# Patient Record
Sex: MALE | Race: WHITE | URBAN - METROPOLITAN AREA
[De-identification: names, ages, dates, MRNs, and addresses within clinical notes are randomized per-mention and may not be internally consistent; named-entity substitution may affect disease eponyms.]

---

## 2017-02-02 ENCOUNTER — OFFICE VISIT (OUTPATIENT)
Dept: FAMILY MEDICINE | Facility: CLINIC | Age: 47
End: 2017-02-02

## 2017-02-02 VITALS
OXYGEN SATURATION: 96 % | HEART RATE: 77 BPM | WEIGHT: 260 LBS | BODY MASS INDEX: 37.22 KG/M2 | SYSTOLIC BLOOD PRESSURE: 140 MMHG | DIASTOLIC BLOOD PRESSURE: 88 MMHG | TEMPERATURE: 98.2 F | RESPIRATION RATE: 15 BRPM | HEIGHT: 70 IN

## 2017-02-02 DIAGNOSIS — R07.0 THROAT PAIN: Primary | ICD-10-CM

## 2017-02-02 NOTE — MR AVS SNAPSHOT
After Visit Summary   2/2/2017    Silvestre Childers    MRN: [id number]           Patient Information     Date Of Birth          1970        Visit Information        Provider Department      2/2/2017 8:00 PM Clinician, Lucia [facility]        Care Instructions    Patient Visit Summary    Patient Name: Silvestre Childers  MRN: [id number]    Date of Visit: 2/2/2017    Physician's Recommendations/Instructions:   1. Combine liquid Benadryl and Maalox in equal parts, swish mixture in your throat and then spit the medicine out. Take every 4 hours as needed to sooth throat pain.   2. Take Cepacol numbing cough drops to sooth throat pain   3. To reduce nasal drip and congestion - use a saline spray (i.e. Oceanspray) to flush nose daily or twice daily.  3. Continue drinking fluids        Referrals and Instructions:   1. Return to [facility] to talk with [employer] about employment   2. Visit or call [facility] at [phone] to ask about their dental care options.     Physician: Dr. Sanchez         Follow-ups after your visit        Who to contact     Please call your clinic at [phone] to:    Ask questions about your health    Make or cancel appointments    Discuss your medicines    Learn about your test results    Speak to your doctor   If you have compliments or concerns about an experience at your clinic, or if you wish to file a complaint, please contact [facility] Patient Relations at [phone] or email us at [email or web address]         Additional Information About Your Visit        MyChart Information     [employer] is an electronic gateway that provides easy, online access to your medical records. With [employer], you can request a clinic appointment, read your test results, renew a prescription or communicate with your care team.     To sign up for [employer] visit the website at  "[email or web address]   You will be asked to enter the access code listed below, as well as some personal information. Please follow the directions to create your username and password.     Your access code is: [email or web address]  Expires: 5/3/2017 10:00 PM     Your access code will  in 90 days. If you need help or a new code, please contact your [facility] or call [phone] for assistance.        Care EveryWhere ID     This is your Care EveryWhere ID. This could be used by other organizations to access your [place] medical records  [id number]        Your Vitals Were     Pulse Temperature Respirations Height BMI (Body Mass Index) Pulse Oximetry    77 98.2  F (36.8  C) 15 5' 10\" (177.8 cm) 37.31 kg/m2 96%       Blood Pressure from Last 3 Encounters:   17 140/88    Weight from Last 3 Encounters:   17 260 lb (117.935 kg)              Today, you had the following     No orders found for display       Primary Care Provider    None Specified       No primary provider on file.        Thank you!     Thank you for choosing [facility]  for your care. Our goal is always to provide you with excellent care. Hearing back from our patients is one way we can continue to improve our services. Please take a few minutes to complete the written survey that you may receive in the mail after your visit with us. Thank you!             Your Updated Medication List - Protect others around you: Learn how to safely use, store and throw away your medicines at www.disposemymeds.org.      Notice  As of 2017 10:00 PM    You have not been prescribed any medications.      "

## 2017-02-02 NOTE — Clinical Note
I have completed my note, please review, add tie in statement and close encounter. Please email me at [email or web address] with any questions or concerns.   Thanks!   Jihyeon Shin

## 2017-02-03 ASSESSMENT — PATIENT HEALTH QUESTIONNAIRE - PHQ9: SUM OF ALL RESPONSES TO PHQ QUESTIONS 1-9: 17

## 2017-02-03 NOTE — PROGRESS NOTES
"[facility] Progress Note    Chief complaint: Sore throat    Subjective:    Patient came in with a self-diagnosis of strep throat. He's had a sore throat and excruciating headache that accompanied throat soreness since Monday. Patient started taking Ibuprofen 2400mg daily (1200mg in the morning and another 1200mg 6pm) to control his headache, but he did stop prior to visiting the clinic. Patient says the mucus buildup gets worse in the morning, but clears up throughout the day.   Condition 1: Sore throat  No current outpatient prescriptions on file.         Objective: No sign of strep throat is detected.   /88 mmHg  Pulse 77  Temp(Src) 98.2  F (36.8  C)  Resp 15  Ht 5' 10\" (177.8 cm)  Wt 260 lb (117.935 kg)  BMI 37.31 kg/m2  SpO2 96%    Assessment:     Condition 1- Sore throat  DTP: Needs Additional Therapy: untreated condition   Rationale: No sign of strep throat or enlarged lymph nodes. Patient is instructed to drink lots of fluid, try salt rinse, Maalox + benadryl mix, cepacol to alleviate his throat pain.      Plan:  1.  patient on using Benadryl + Maalox mixed in equal parts as a rinse.  2. Cepacol cough drops,   3. Ensure adequate hydration.         PharmCare Clinician: Jinyeon Shin  Pharmacy Preceptor: DANE Temple PharmD    _____________________________  Preceptor Use Only:  In supervising the student, I have reviewed and verified the student's documentation and found it to be correct and complete.   Preceptor Signature: DANE Temple PharmD  [facility]      ROS      Physical Exam      "

## 2017-02-03 NOTE — PROGRESS NOTES
"Pt complains of a sore throat x 4 days. Associated with swollen lymph nodes, swollen neck, dysphagia, \"white spots\" on back of throat, \"out of control\" headache, post-nasal drip, cough caused by PND. Takes Zyrtec for allergies; has been taking ibuprofen and multivitamin since onset. Pt states ibuprofen \"takes the edge off.\"    Pt also stated he is unemployed x 4 months. Interested in speaking to social work regarding resources. Will return to clinic next week to follow-up with CHW and SW.  "

## 2017-02-03 NOTE — PATIENT INSTRUCTIONS
Patient Visit Summary    Patient Name: Silvestre Childers  MRN: [id number]    Date of Visit: 2/2/2017    Physician's Recommendations/Instructions:   1. Combine liquid Benadryl and Maalox in equal parts, swish mixture in your throat and then spit the medicine out. Take every 4 hours as needed to sooth throat pain.   2. Take Cepacol numbing cough drops to sooth throat pain   3. To reduce nasal drip and congestion - use a saline spray (i.e. Oceanspray) to flush nose daily or twice daily.  3. Continue drinking fluids        Referrals and Instructions:   1. Return to [facility] to talk with [employer] about employment   2. Visit or call [facility] ([facility]) at [phone] to ask about their dental care options.     Physician: Dr. Sanchez

## 2017-02-08 NOTE — PROGRESS NOTES
"HPI      ROS      Physical Exam    MEDICINE NOTE    SUBJECTIVE:  CC: Sore Throat  Silvestre is a 46 year old man who come in today complaining of a sore throat, adenopathy, painful swallowing, tongue swelling, headache, rhinorrhea, and cough since Monday (4 days). He complains of nasal congestion that's worse at night, and believes postnasal drip is making his throat worse He denies fever or chills. The headache and painful swallowing have been especially negatively effecting his life. He has tried ibuprofen (2400 mg/day) which \"took the edge off\" but didn't resolve the pain. He believes this is strep throat, and believes he has seen white patches on the soft pallet and throat.     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 He is especially concerned about his lack of health insurance.       REVIEW OF SYSTEMS:  Gen: no fevers, night sweats  Ears, Noses, Mouth, Throat: nasal discharge, no oral lesions, sore throat  Lungs: cough, no dyspnea  Heme/Lymph: no concerning bumps  Allergy: environmental allergies, no drug allergies      No past medical history on file.    No past surgical history on file.    No family history on file.    Social History     Social History     Marital Status:      Spouse Name: N/A     Number of Children: N/A     Years of Education: N/A     Occupational History     Not on file.     Social History Main Topics     Smoking status: Former Smoker -- 1.50 packs/day     Types: Cigarettes     Quit date: 02/02/2007     Smokeless tobacco: Not on file     Alcohol Use: 1.2 - 1.8 oz/week     2-3 Standard drinks or equivalent per week     Drug Use: Yes     Special: Marijuana      Comment: Occasional use     Sexual Activity: Not on file     Other Topics Concern     Not on file     Social History Narrative     No narrative on file       OBJECTIVE:  Physical Exam:  /88 mmHg  Pulse 77  Temp(Src) " "98.2  F (36.8  C)  Resp 15  Ht 5' 10\" (177.8 cm)  Wt 260 lb (117.935 kg)  BMI 37.31 kg/m2  SpO2 96%  Constitutional: no distress, comfortable, pleasant   Eyes: anicteric  Ears, Nose and Throat: nose clear and free of lesions, throat erythematous, no exudate  Cardiovascular: regular rate and rhythm, normal S1 and S2, no murmurs, rubs or gallops  Respiratory: clear to auscultation, no wheezes or crackles, normal breath sounds    Psychological: appropriate mood   Lymphatic: no cervical or clavicular lymphadenopathy    ASSESSMENT/PLAN:  There are no diagnoses linked to this encounter.   1) Sore Throat - My differential included Viral URI, Strep Throat, Allergies. Due to his lack of fever and adenopathy, as well as having a cough his symptoms correlate more strongly with a viral upper respiratory infection. We chose not to perform a strep test or perscribe antibiotics because his score for the Centor Criteria was -1. We recommended that he control his symptoms utilizing numbing cough drops and mouth rinse, nasal spray to dilute and remove mucus, and drinking fluids. We told him to return if his symptoms are not improving in two weeks.    2) Anxiety/Depression expressed to nursing student - we recommended he return to [facility] another night earlier when the Community [employer] are available to provide resources to him.    Med Clinician: Maia Duffy  Preceptor: Lili Sanchez    In supervising the medical student, I repeated the exam documented above.  I have reviewed and verified the student s documentation.  Supervising Provider: Lili Sanchez MD         "

## 2017-06-05 ENCOUNTER — OFFICE VISIT (OUTPATIENT)
Dept: FAMILY MEDICINE | Facility: CLINIC | Age: 47
End: 2017-06-05

## 2017-06-05 VITALS
SYSTOLIC BLOOD PRESSURE: 130 MMHG | DIASTOLIC BLOOD PRESSURE: 90 MMHG | OXYGEN SATURATION: 94 % | HEART RATE: 63 BPM | HEIGHT: 71 IN | BODY MASS INDEX: 34.33 KG/M2 | RESPIRATION RATE: 15 BRPM | WEIGHT: 245.2 LBS | TEMPERATURE: 98.3 F

## 2017-06-05 DIAGNOSIS — J20.9 ACUTE BRONCHITIS, UNSPECIFIED ORGANISM: Primary | ICD-10-CM

## 2017-06-05 NOTE — Clinical Note
"Nice job in summarizing your assessment and creating a basis for your diagnosis. In order to add that cherry on top, you can add the pertinent negatives - \"doubt pneumonia without fever, consolidation; doubt sinus infection, and would not treat until symptoms persist for at least 10 days\". Something along those lines would help justify your plan and give a sense for what to look for in future encounters in case symptoms worsen. He may have bronchitis now, but this can bloom into a superinfection! (I love that word)  Nice job! Garth"

## 2017-06-05 NOTE — Clinical Note
I have completed my note, please review, add tie in statement and close encounter.   Thanks!   Sylvie Gipson

## 2017-06-05 NOTE — Clinical Note
Hi Dr. Todd,  The note for patient KT is ready for you to sign.  Let me know if you have any questions.  Chan Velazquez ([email or web address])

## 2017-06-05 NOTE — MR AVS SNAPSHOT
"              After Visit Summary   2017    Silvestre Childers    MRN: [id number]           Patient Information     Date Of Birth          1970        Visit Information        Provider Department      2017 8:30 PM Clinician, Lucia [facility]        Today's Diagnoses     Acute bronchitis, unspecified organism    -  1       Follow-ups after your visit        Who to contact     Please call your clinic at [phone] to:    Ask questions about your health    Make or cancel appointments    Discuss your medicines    Learn about your test results    Speak to your doctor   If you have compliments or concerns about an experience at your clinic, or if you wish to file a complaint, please contact [facility] Patient Relations at [phone] or email us at [email or web address]         Additional Information About Your Visit        MyChart Information     [employer] is an electronic gateway that provides easy, online access to your medical records. With [employer], you can request a clinic appointment, read your test results, renew a prescription or communicate with your care team.     To sign up for [employer] visit the website at [email or web address]   You will be asked to enter the access code listed below, as well as some personal information. Please follow the directions to create your username and password.     Your access code is: [email or web address]  Expires: 2018  9:29 AM     Your access code will  in 90 days. If you need help or a new code, please contact your [facility] or call [phone] for assistance.        Care EveryWhere ID     This is your Care EveryWhere ID. This could be used by other organizations to access your [place] medical records  [id number]        Your Vitals Were     Pulse Temperature Respirations Height Pulse Oximetry BMI (Body Mass Index)    63 98.3  F (36.8  C) (Oral) 15 5' 10.5\" (179.1 cm) " 94% 34.69 kg/m2       Blood Pressure from Last 3 Encounters:   06/05/17 130/90   02/02/17 140/88    Weight from Last 3 Encounters:   06/05/17 245 lb 3.2 oz (111.2 kg)   02/02/17 260 lb (117.9 kg)              Today, you had the following     No orders found for display       Primary Care Provider    None Specified       No primary provider on file.        Equal Access to Services     [facility] : Hadii aad ku hadminesho Somartinaali, waaxda luqadaha, qaybta kaalmada adeegyada, waxay ivannain armanin hope geemarniandi chaney . So [facility] [phone].    ATENCIÓN: Si habla español, tiene a vogel disposición servicios gratuitos de asistencia lingüística. Llame al [phone].    We comply with applicable federal civil rights laws and [place] laws. We do not discriminate on the basis of race, color, national origin, age, disability, sex, sexual orientation, or gender identity.            Thank you!     Thank you for choosing [facility]  for your care. Our goal is always to provide you with excellent care. Hearing back from our patients is one way we can continue to improve our services. Please take a few minutes to complete the written survey that you may receive in the mail after your visit with us. Thank you!             Your Updated Medication List - Protect others around you: Learn how to safely use, store and throw away your medicines at www.disposemymeds.org.          This list is accurate as of: 6/5/17 11:59 PM.  Always use your most recent med list.                   Brand Name Dispense Instructions for use Diagnosis    cetirizine 10 MG tablet    zyrTEC     Take 10 mg by mouth daily as needed for allergies        dextromethorphan 15 MG/5ML syrup      Take 10 mLs by mouth 2 times daily        IBUPROFEN PO      Take 800 mg by mouth 2 times daily as needed for moderate pain

## 2017-06-06 NOTE — PROGRESS NOTES
MEDICINE NOTE    SUBJECTIVE:  Mr. Childers is a 47-year-old male former smoker who is presenting to clinic today with a chief complaint of cough.  He states that the cough began about 5 days ago and was accompanied with a headache, sinus congestion and a hoarse throat.  This morning he woke up and began coughing up bright red, bloody sputum.  This has since resolved.  He denies shortness of breath, fever, chills, nausea and diarrhea.  He is currently taking Dayquil (dextromethorphan, acetaminophen and phenylephrine) twice daily to help relieve his symptoms. Silvestre is a  and says that the cough has really interfered with his ability to talk to customers.       REVIEW OF SYSTEMS:  Gen: no fevers, night sweats or weight change  Eyes: no vision change, diplopia or red eyes  Ears, Noses, Mouth, Throat: no ringing in ears or hearing change, no epistaxis or nasal discharge, no oral lesions  Cardiac: no chest pain, palpitations, or pain with walking  Lungs: See HPI  GI: no nausea, vomiting, diarrhea or constipation, no abdominal pain  : no change in urine, hematuria, or sexual dysfunction  Musculoskeletal: no joint or muscle pain or swelling  Skin: no concerning lesions or moles  Neuro: no loss of strength or sensation, no numbness or tingling, no tremor  Endo: no polyuria or polydipsia, no temperature intolerance  Heme/Lymph: no concerning bumps, no bleeding problems  Allergy: Allergies to dust mites and mold  Psych: no depression or anxiety    No past medical history on file.    No past surgical history on file.    No family history on file.    Social History     Social History     Marital status:      Spouse name: N/A     Number of children: N/A     Years of education: N/A     Occupational History     Not on file.     Social History Main Topics     Smoking status: Former Smoker     Packs/day: 1.50     Types: Cigarettes     Quit date: 2/2/2007     Smokeless tobacco: Not on file     Alcohol use 1.2 -  "1.8 oz/week     2 - 3 Standard drinks or equivalent per week     Drug use: Yes     Special: Marijuana      Comment: Occasional use     Sexual activity: Not on file     Other Topics Concern     Not on file     Social History Narrative    Date of Service:2017     Name: Silvestre MORSE (Month, Day, Year of birth): 1970     MRN: [id number]     New Patient:              NO    Preferred Language: English     Needed:         NO    County of Residence:     Marital Status:     Household size: 1    Number of Dependents:    (Provide number)    Pregnant:             (YES / NO Answer required)    Average Annual Income: $19,000    Citizenship Status: [place] citizen    Notes on Assistance Programs:        Patient previously had government insurance and was dropped when his income became too high. He currently doesn't have insuranc and would like to apply again and wants to make sure he's not dropped again or pay back any medical costs. We provided him with [employer] and application assistance. - [place] and [place].       OBJECTIVE:  Physical Exam:  /90 (BP Location: Left arm)  Pulse 63  Temp 98.3  F (36.8  C) (Oral)  Resp 15  Ht 5' 10.5\" (179.1 cm)  Wt 245 lb 3.2 oz (111.2 kg)  SpO2 94%  BMI 34.69 kg/m2  Constitutional: no distress, comfortable, pleasant   Eyes: N/A   Ears, Nose and Throat: N/A  Cardiovascular: N/A  Respiratory: clear to auscultation, no wheezes or crackles, normal breath sounds   Gastrointestinal: N/A   Musculoskeletal: N/A  Skin: N/A  Neurological: N/A  Psychological: N/A  Lymphatic: no cervical lymphadenopathy    ASSESSMENT/PLAN:  Mr. Childers is a 47-year-old male former smoker presenting with a junky cough.  On history, he stated that the symptoms began 5 days ago and were accompanied by headache, sinus congestion and a hoarse voice.  On physical exam, lungs sounded clear in all fields with no wheezes or crackles.  The symptoms of cough, sinus congestion and " hoarse voice as well as lung fields sounding clear bilaterally upon auscultation suggest bronchitis.    Plan:  Continue taking Dayquil twice daily (dextromethorphan, acetaminophen and phenylephrine)  Return to [facility] in two weeks if symptoms are not resolving    Med Clinician: Chan Velazquez  Preceptor: John Todd      In supervising the medical student, I repeated the exam documented above.  I have reviewed and verified the student s documentation.  Supervising Provider: John Todd MD

## 2017-06-06 NOTE — NURSING NOTE
"Patient reported to the [facility] for respiratory issues. He thinks he has pneumonia. He said he has a history of respiratory issues. The patient reported a severe headache, losing his voice, sinus congestion, and coughing starting last Wednesday. He said yesterday he began coughing up pink tinged mucous yesterday that became bright red this morning and said that it has gotten severe enough that he called in sick to work, which he \"never does\".  He coughed up \"a lot\" or mucous and said it was clear by the end of the coughing fit. The patient reported taking dayqil each morning and nyquil each evening since last Thursday. He reported that it seems to be helping a little, but he is still coughing a lot and having trouble breathing deeply. He said he has also been taking zyrtec.     The patient reported using caffeine daily until his illness started. He denied using tobacco and said he drinks a bottle of wine each week. HE admitted to using marijuna occasionally.   "

## 2017-06-07 RX ORDER — CETIRIZINE HYDROCHLORIDE 10 MG/1
10 TABLET ORAL DAILY PRN
COMMUNITY

## 2017-06-08 NOTE — PROGRESS NOTES
PHARMACEUTICAL CARE NOTE    Date of Service:2017   Patient Name: Silvestre Childers   YOB: 1970   MRN: [id number]   Phone Number: [phone] (home)     Allergies   Allergen Reactions     Dust Mites      Mold         Health Conditions (including date of diagnosis):      No past medical history on file.       Current Outpatient Prescriptions   Medication Sig Dispense Refill     cetirizine (ZYRTEC) 10 MG tablet Take 10 mg by mouth daily as needed for allergies       IBUPROFEN PO Take 800 mg by mouth 2 times daily as needed for moderate pain       dextromethorphan 15 MG/5ML syrup Take 10 mLs by mouth 2 times daily         Social History     Social History     Marital status:      Spouse name: N/A     Number of children: N/A     Years of education: N/A     Social History Main Topics     Smoking status: Former Smoker     Packs/day: 2.00     Types: Cigarettes     Quit date: 2007     Smokeless tobacco: Not on file     Alcohol use 1.2 - 1.8 oz/week     2 - 3 Standard drinks or equivalent per week      Comment: Drink a bottle of wine per week     Drug use: Yes     Special: Marijuana      Comment: Occasional use/ About once weekly     Sexual activity: Not on file     Other Topics Concern     Not on file     Social History Narrative    Date of Service:2017     Name: Silvestre Childers DOB (Month, Day, Year of birth): 1970     MRN: [id number]     New Patient:              NO    Preferred Language: English     Needed:         NO    County of Residence:     Marital Status:     Household size: 1    Number of Dependents:    (Provide number)    Pregnant:             (YES / NO Answer required)    Average Annual Income: $19,000    Citizenship Status: [place] citizen    Notes on Assistance Programs:        Patient previously had government insurance and was dropped when his income became too high. He currently doesn't have insuranc and would like to apply again and wants to make  "sure he's not dropped again or pay back any medical costs. We provided him with [employer] and application assistance. - [place] and [place].       There is no immunization history on file for this patient.     Summary of Visit:   Reason for encounter:   Patient RANDY presents to [facility] complaining of a cough with bright red and bloody sputum.    Subjective:  Condition 1: Bronchitis  Patient RANDY, a 47-year-old male, presents to [facility] complaining of a cough starting from last week. He got cold, sinus congestion and hoarse voice since last week. He coughed up bright red and bloody sputum this morning. He is a former smoker, and he is allergic to mold. He reports that he caught bronchitis many times in the past. The patient thinks that he catches \"pneumonia\" this time instead of \"bronchitis\" because his symptom seemed different from previous experiences. The patient is currently taking Dayqil (acetaminophen, dextromethorphan, and phenylephrine) around 7:30 am every morning and Nyquil (acetaminophen, dextromethorphan, doxylamine) around 9:00 pm each night. He is taking Dayquil and Nyquil combo to help him sleep well and improve his symptoms, and he said this product worked well when he had the similar symptoms before. He is not sure about what kinds of Dayquil and Nyquil combo that he is using. Also, he is taking Zyrtec to relieve his seasonal allergy, and ibuprofen to relieve pain for sinus swelling. He takes Marijuana once a month.    Assessment:   Condition 1: Bronchitis  No drug therapy problems were identified. Continue using Dayquil and Nyquil combo, Zyrtec and ibuprofen to relieve symptoms caused by cold and bronchitis.     Plan:   Condition 1: Bronchitis  1. Continue taking Dayquil and Nyquil combo, Zyrtec and ibuprofen  2. Follow-up: If the symptoms does not improve in two weeks, come to [facility] to review    Pharm Clinician: Dimitri Gipson  Preceptors Involved in Service: Rissa Houston, PharmD    .I am signing this for Rissa " Wonderful who has been unable to sign this note in a timely manner.  The content of this note has been reviewed by the preceptor for accuracy.  I did not participate in the care of this patient.  Olivier Crews MD - Medical Director, [facility]